# Patient Record
Sex: FEMALE | Race: WHITE | NOT HISPANIC OR LATINO | Employment: PART TIME | URBAN - METROPOLITAN AREA
[De-identification: names, ages, dates, MRNs, and addresses within clinical notes are randomized per-mention and may not be internally consistent; named-entity substitution may affect disease eponyms.]

---

## 2017-11-07 ENCOUNTER — OFFICE VISIT (OUTPATIENT)
Dept: FAMILY MEDICINE CLINIC | Facility: CLINIC | Age: 52
End: 2017-11-07

## 2017-11-07 ENCOUNTER — HOSPITAL ENCOUNTER (OUTPATIENT)
Dept: GENERAL RADIOLOGY | Facility: HOSPITAL | Age: 52
Discharge: HOME | End: 2017-11-07
Admitting: FAMILY MEDICINE

## 2017-11-07 VITALS
RESPIRATION RATE: 18 BRPM | BODY MASS INDEX: 24.8 KG/M2 | TEMPERATURE: 97.4 F | WEIGHT: 140 LBS | HEIGHT: 63 IN | DIASTOLIC BLOOD PRESSURE: 64 MMHG | HEART RATE: 76 BPM | SYSTOLIC BLOOD PRESSURE: 114 MMHG

## 2017-11-07 DIAGNOSIS — R06.02 SHORTNESS OF BREATH: Primary | ICD-10-CM

## 2017-11-07 DIAGNOSIS — M62.89 TENSOR FASCIA LATA SYNDROME: ICD-10-CM

## 2017-11-07 LAB
ALBUMIN SERPL-MCNC: 4.5 G/DL (ref 3.2–4.8)
ALBUMIN/GLOB SERPL: 2.1 G/DL (ref 1.5–2.5)
ALP SERPL-CCNC: 78 U/L (ref 25–100)
ALT SERPL-CCNC: 15 U/L (ref 7–40)
AST SERPL-CCNC: 23 U/L (ref 0–33)
BASOPHILS # BLD AUTO: 0.03 10*3/MM3 (ref 0–0.2)
BASOPHILS NFR BLD AUTO: 0.5 % (ref 0–1)
BILIRUB SERPL-MCNC: 0.7 MG/DL (ref 0.3–1.2)
BUN SERPL-MCNC: 10 MG/DL (ref 9–23)
BUN/CREAT SERPL: 11.1 (ref 7–25)
CALCIUM SERPL-MCNC: 9.6 MG/DL (ref 8.7–10.4)
CHLORIDE SERPL-SCNC: 108 MMOL/L (ref 99–109)
CO2 SERPL-SCNC: 29 MMOL/L (ref 20–31)
CREAT SERPL-MCNC: 0.9 MG/DL (ref 0.6–1.3)
EOSINOPHIL # BLD AUTO: 0.13 10*3/MM3 (ref 0–0.3)
EOSINOPHIL NFR BLD AUTO: 2.1 % (ref 0–3)
ERYTHROCYTE [DISTWIDTH] IN BLOOD BY AUTOMATED COUNT: 13.2 % (ref 11.3–14.5)
GFR SERPLBLD CREATININE-BSD FMLA CKD-EPI: 66 ML/MIN/1.73
GFR SERPLBLD CREATININE-BSD FMLA CKD-EPI: 80 ML/MIN/1.73
GLOBULIN SER CALC-MCNC: 2.1 GM/DL
GLUCOSE SERPL-MCNC: 89 MG/DL (ref 70–100)
HCT VFR BLD AUTO: 39.9 % (ref 34.5–44)
HGB BLD-MCNC: 12.9 G/DL (ref 11.5–15.5)
IMM GRANULOCYTES # BLD: 0.01 10*3/MM3 (ref 0–0.03)
IMM GRANULOCYTES NFR BLD: 0.2 % (ref 0–0.6)
LYMPHOCYTES # BLD AUTO: 2.55 10*3/MM3 (ref 0.6–4.8)
LYMPHOCYTES NFR BLD AUTO: 41 % (ref 24–44)
MCH RBC QN AUTO: 28.7 PG (ref 27–31)
MCHC RBC AUTO-ENTMCNC: 32.3 G/DL (ref 32–36)
MCV RBC AUTO: 88.7 FL (ref 80–99)
MONOCYTES # BLD AUTO: 0.42 10*3/MM3 (ref 0–1)
MONOCYTES NFR BLD AUTO: 6.8 % (ref 0–12)
NEUTROPHILS # BLD AUTO: 3.08 10*3/MM3 (ref 1.5–8.3)
NEUTROPHILS NFR BLD AUTO: 49.4 % (ref 41–71)
PLATELET # BLD AUTO: 180 10*3/MM3 (ref 150–450)
POTASSIUM SERPL-SCNC: 4.1 MMOL/L (ref 3.5–5.5)
PROT SERPL-MCNC: 6.6 G/DL (ref 5.7–8.2)
RBC # BLD AUTO: 4.5 10*6/MM3 (ref 3.89–5.14)
SODIUM SERPL-SCNC: 144 MMOL/L (ref 132–146)
WBC # BLD AUTO: 6.22 10*3/MM3 (ref 3.5–10.8)

## 2017-11-07 PROCEDURE — 99214 OFFICE O/P EST MOD 30 MIN: CPT | Performed by: FAMILY MEDICINE

## 2017-11-07 PROCEDURE — 71020 HC CHEST PA AND LATERAL: CPT

## 2017-11-07 NOTE — PROGRESS NOTES
Subjective   Sena Rogers is a 52 y.o. female.     History of Present Illness     She can do physical activity and feel fine  However from time to time she is laying in bed and feels like she cannot get a full breath in  She also yawns all the time  Will have a lot of yawning a lot and then will feel better after time  Can happen other times as well  This can happen several times or not at all during the day  She notes she had some issues at [facility] with a breathing test 20 years ago  Aggravating: stress, worry (she does worry more at night)  Alleviating: time    She also notes that her hip started to hurt her when she walked a long distance in her neighborhood a couple weeks ago  Pain on her outside of both thighs  Did not use anything and this slowly improved  She then went on a vacation to [facility] and her legs started to hurt again    The following portions of the patient's history were reviewed and updated as appropriate: allergies, current medications, past family history, past medical history, past social history, past surgical history and problem list.    Review of Systems   Constitutional: Negative.    HENT: Negative.    Eyes: Negative.    Respiratory: Positive for shortness of breath. Negative for cough.    Cardiovascular: Negative.  Negative for chest pain and palpitations.   Gastrointestinal: Negative.    Musculoskeletal:        Hpi   Skin: Negative.    Neurological: Negative.    Psychiatric/Behavioral: Negative.    All other systems reviewed and are negative.      Objective   Physical Exam   Constitutional: She is oriented to person, place, and time. She appears well-developed and well-nourished. No distress.   HENT:   Head: Normocephalic and atraumatic.   Right Ear: Tympanic membrane, external ear and ear canal normal.   Left Ear: Tympanic membrane, external ear and ear canal normal.   Nose: Nose normal.   Mouth/Throat: Uvula is midline and oropharynx is clear and moist.   Eyes:  Conjunctivae and EOM are normal.   Neck: Normal range of motion. Neck supple. No thyromegaly present.   Cardiovascular: Normal rate, regular rhythm and normal heart sounds.    No murmur heard.  Pulmonary/Chest: Effort normal and breath sounds normal. No respiratory distress.   Abdominal: Soft. Bowel sounds are normal. She exhibits no distension and no mass. There is no tenderness.   Musculoskeletal:   Normal inspection and palpation of her hips.  Normal internal/external rotation of her hips.  Normal gait.   Lymphadenopathy:     She has no cervical adenopathy.   Neurological: She is alert and oriented to person, place, and time.   Skin: Skin is warm and dry.   Psychiatric: She has a normal mood and affect. Her behavior is normal. Judgment and thought content normal.   Nursing note and vitals reviewed.      Assessment/Plan   Sena was seen today for hip pain and breathing problem.    Diagnoses and all orders for this visit:    Shortness of breath  -     XR Chest PA & Lateral  -     Full Pulmonary Function Test With Bronchodilator; Future  -     CBC & Differential  -     Comprehensive Metabolic Panel    Tensor fascia radha syndrome    unsure of cause of SOA, this happens at rest, no CP associated.  Could be stress related.  Will check CXR, labs and PFTs.  F/u pending results.  Pt agrees and she will RTO if this worsens  Discussed with pt the cause of her pain with excessive walking.  Consider formal PT but would increase her exercise tolerance and stretch to prevent in the future but she has no plans to walk this much any time soon

## 2017-11-17 ENCOUNTER — OFFICE VISIT (OUTPATIENT)
Dept: PULMONOLOGY | Facility: CLINIC | Age: 52
End: 2017-11-17

## 2017-11-17 DIAGNOSIS — R06.02 SHORTNESS OF BREATH: Primary | ICD-10-CM

## 2017-11-17 PROCEDURE — 94060 EVALUATION OF WHEEZING: CPT | Performed by: INTERNAL MEDICINE

## 2017-11-17 PROCEDURE — 94726 PLETHYSMOGRAPHY LUNG VOLUMES: CPT | Performed by: INTERNAL MEDICINE

## 2017-11-17 PROCEDURE — 94729 DIFFUSING CAPACITY: CPT | Performed by: INTERNAL MEDICINE

## 2017-11-22 ENCOUNTER — TELEPHONE (OUTPATIENT)
Dept: FAMILY MEDICINE CLINIC | Facility: CLINIC | Age: 52
End: 2017-11-22

## 2017-11-22 NOTE — TELEPHONE ENCOUNTER
----- Message from Evangelina Barragan sent at 11/22/2017  2:11 PM EST -----  Contact: DR PERSAUD RESULTS INQUIRY  PATIENT IS CALLING ABOUT HER PULMONARY FUNCTION RESULTS PLEASE CALL BACK AT [phone].

## 2017-11-28 ENCOUNTER — TELEPHONE (OUTPATIENT)
Dept: FAMILY MEDICINE CLINIC | Facility: CLINIC | Age: 52
End: 2017-11-28

## 2017-11-28 NOTE — TELEPHONE ENCOUNTER
----- Message from Corinna Jackson MA sent at 11/28/2017  3:53 PM EST -----  Regarding: FW: Test Results Question  Contact: [phone]      ----- Message -----     From: Sena Rogers     Sent: 11/28/2017   3:31 PM       To: [facility]  Subject: Test Results Question                            I would like to understand the PFT results, diagnosis, and suggested treatments. I have picked up the prescribed inhaler, but would like to know if that's a short- or long-term treatment. Thank you.

## 2017-11-28 NOTE — TELEPHONE ENCOUNTER
We are going to try this inhaler and follow up to discuss things further.  If she feels better it may be long term.  Please schedule f/u appointment in the next couple weeks to discuss how this inhaler is working.

## 2017-11-29 NOTE — TELEPHONE ENCOUNTER
Spoke with pt and went over response. Verbalized understanding. She will check her work schedule then call back to schedule f/u appt

## 2017-12-29 ENCOUNTER — TELEPHONE (OUTPATIENT)
Dept: FAMILY MEDICINE CLINIC | Facility: CLINIC | Age: 52
End: 2017-12-29

## 2017-12-29 ENCOUNTER — PATIENT MESSAGE (OUTPATIENT)
Dept: PULMONOLOGY | Facility: CLINIC | Age: 52
End: 2017-12-29

## 2017-12-29 RX ORDER — FLUTICASONE PROPIONATE 110 UG/1
1 AEROSOL, METERED RESPIRATORY (INHALATION)
Qty: 1 INHALER | Refills: 5 | Status: SHIPPED | OUTPATIENT
Start: 2017-12-29

## 2017-12-29 NOTE — TELEPHONE ENCOUNTER
----- Message from Evangelina Barragan sent at 12/29/2017  8:32 AM EST -----  Contact: DR PERSAUD   PATIENT HAS BEEN TRYING THE INHALER (FLOVENT) FOR THE PAST MONTH. IT HAS BEEN HELPING A LITTLE. SHOULD SHE STAY ON THIS? WHAT SHOULD SHE DO?  [phone]

## 2018-01-10 ENCOUNTER — TELEPHONE (OUTPATIENT)
Dept: FAMILY MEDICINE CLINIC | Facility: CLINIC | Age: 53
End: 2018-01-10

## 2018-01-10 PROBLEM — J44.9 OBSTRUCTIVE LUNG DISEASE (HCC): Status: ACTIVE | Noted: 2018-01-10

## 2018-01-10 NOTE — TELEPHONE ENCOUNTER
Her diagnosis is mild obstructive lung disease, based on her PFTs.  The inhaler is long term as it calms down the inflammation felt to cause obstruction.  Without it the SOA will return.

## 2018-01-10 NOTE — TELEPHONE ENCOUNTER
Regarding: Test Results Question  Contact: [phone]  ----- Message from Celina Weeks MA sent at 1/8/2018  3:06 PM EST -----       ----- Message from Sena Rogers to Gwyn Wilkins MD sent at 1/8/2018  2:57 PM -----   I received a call back to continue using the inhaler for my shortness of breath, but I still do not know the diagnosis from my recent visit and tests. Is the diagnosis Asthma or something else, and is the use of the inhaler long-term or will it resolve the problem?  Thank you.

## 2018-01-12 ENCOUNTER — TELEPHONE (OUTPATIENT)
Dept: FAMILY MEDICINE CLINIC | Facility: CLINIC | Age: 53
End: 2018-01-12

## 2018-01-12 NOTE — TELEPHONE ENCOUNTER
"----- Message from Corinna Jackson MA sent at 1/11/2018 12:47 PM EST -----  Regarding: FW: Test Results Question  Contact: [phone]      ----- Message -----     From: Sena Rogers     Sent: 1/11/2018  12:41 PM       To: [facility]  Subject: Test Results Question                            I made an appointment in November in hopes of finding the cause of my shortness of breath. I was sent to several locations for various tests, all of which I completed. Following these tests, a prescription was called in for an inhaler. I have made 3 attempts to contact Dr. Wilkins for a diagnosis and further information. I have received 2 callbacks from someone else in the office re: these messages. I don't know if these messages are actually given to the doctor, but I would like to pass along that I have been disappointed in the lack of information, and apparent interest, in my health concerns. I was only told to use the inhaler, and that a follow-up appointment was not necessary. I don't blame the caller, as I'm sure she was providing all the information she was able to provide. I still did not receive a diagnosis. Following my third attempt, the person who called me back did read off the diagnosis to me.  I received what I feel is a devastating diagnosis of an incurable condition,  and then could not get answers to any of my questions. After asking a few basic questions, I realized they could not be answered by the caller and I gave up.    I put my terry in your practice, and recommend it to my friends, and yet I am currently on Google trying to get more information on this diagnosis.  \"Mild Obstructive Pulmonary Disease\" (as it was referred to on the phone) is complex and seems to require very personalized treatment. I deal with severe COPD with my father and it is a terrifying diagnosis, and completely unexpected as I have never smoked or had any of the risk factors.      I am concerned about my " "future health as it is related to COPD, and want to make sure I'm doing all I can to prolong the function of my lungs. I currently don't even have a \"treatment plan\" other than to use the inhaler to minimize discomfort. I just thought this should be shared with you because I am not sure if you're aware of how this all happened, but maybe some practices could be changed so future patients aren't made to feel the same way. Maybe doctors in your practice do not make follow-up calls to their patients, but my opinion is that they should, except in cases where the results are good.  Thank you.  "

## 2018-01-12 NOTE — TELEPHONE ENCOUNTER
SPOKE WITH PT AND INFORMED HER OF MESSAGE AND PT VERBALIZED UNDERSTANDING AND WILL CALL NEXT WEEK ONCE SHE KNOWS HER SCHEDULE.

## 2018-01-12 NOTE — TELEPHONE ENCOUNTER
She is describing the difficulty with phone calls.  It is almost impossible to answer some questions over the phone or to make sure patients are clear on what is going on.  We always encourage patients to come in for appointment so we can make sure everyone is on the same page.  Please have pt schedule an appointment so we can make sure we are all on the same page with her diagnosis, plan of care, and future issues.    Her concerns cannot be adequately reviewed via phone.  Please schedule appointment

## 2018-03-19 ENCOUNTER — TELEPHONE (OUTPATIENT)
Dept: FAMILY MEDICINE CLINIC | Facility: CLINIC | Age: 53
End: 2018-03-19

## 2018-03-28 ENCOUNTER — OFFICE VISIT (OUTPATIENT)
Dept: FAMILY MEDICINE CLINIC | Facility: CLINIC | Age: 53
End: 2018-03-28

## 2018-03-28 VITALS
BODY MASS INDEX: 24.8 KG/M2 | HEIGHT: 63 IN | RESPIRATION RATE: 18 BRPM | TEMPERATURE: 98.4 F | DIASTOLIC BLOOD PRESSURE: 66 MMHG | WEIGHT: 140 LBS | HEART RATE: 74 BPM | SYSTOLIC BLOOD PRESSURE: 110 MMHG

## 2018-03-28 DIAGNOSIS — J41.0 SIMPLE CHRONIC BRONCHITIS (HCC): Primary | ICD-10-CM

## 2018-03-28 PROCEDURE — 99213 OFFICE O/P EST LOW 20 MIN: CPT | Performed by: FAMILY MEDICINE

## 2018-03-28 NOTE — PROGRESS NOTES
Subjective   Sena Rogers is a 52 y.o. female.     History of Present Illness     There was some mild obstructive disease  Dad passed away from COPD yolanda was a long term smoker    She had severe coughing in the AM as well as cleared heart throat a lot in the AM  She has been using the flovent sporadically    Her sister was also diagnosed with COPD 6 years ago and she is worse than pt with her breathing    The following portions of the patient's history were reviewed and updated as appropriate: allergies, current medications, past family history, past medical history, past social history, past surgical history and problem list.    Review of Systems   Constitutional: Negative.        Objective   Physical Exam   Constitutional: She appears well-developed and well-nourished. No distress.   Cardiovascular: Normal rate, regular rhythm and normal heart sounds.    Pulmonary/Chest: Effort normal and breath sounds normal.   Psychiatric: She has a normal mood and affect. Her behavior is normal.   Nursing note and vitals reviewed.      Assessment/Plan   Sena was seen today for follow-up.    Diagnoses and all orders for this visit:    Simple chronic bronchitis    obstruction noted on PFTs, so diagnosis of mild COPD discussed at length with pt.  She has mild symptoms and will use flovent once daily to see if this helps, may go to BID.  Consider spiriva in the future as well.  She will follow up as needed for symptoms and see what pulmonologist recommends to her sister.           
detailed exam

## 2018-07-13 ENCOUNTER — TELEPHONE (OUTPATIENT)
Dept: FAMILY MEDICINE CLINIC | Facility: CLINIC | Age: 53
End: 2018-07-13

## 2018-07-13 DIAGNOSIS — J41.0 SIMPLE CHRONIC BRONCHITIS (HCC): Primary | ICD-10-CM

## 2018-09-10 ENCOUNTER — TELEPHONE (OUTPATIENT)
Dept: FAMILY MEDICINE CLINIC | Facility: CLINIC | Age: 53
End: 2018-09-10

## 2018-09-17 ENCOUNTER — RESULTS ENCOUNTER (OUTPATIENT)
Dept: FAMILY MEDICINE CLINIC | Facility: CLINIC | Age: 53
End: 2018-09-17

## 2018-09-17 ENCOUNTER — OFFICE VISIT (OUTPATIENT)
Dept: FAMILY MEDICINE CLINIC | Facility: CLINIC | Age: 53
End: 2018-09-17

## 2018-09-17 VITALS
HEIGHT: 63 IN | RESPIRATION RATE: 18 BRPM | WEIGHT: 143 LBS | BODY MASS INDEX: 25.34 KG/M2 | DIASTOLIC BLOOD PRESSURE: 82 MMHG | HEART RATE: 72 BPM | TEMPERATURE: 98.3 F | SYSTOLIC BLOOD PRESSURE: 122 MMHG

## 2018-09-17 DIAGNOSIS — J41.0 SIMPLE CHRONIC BRONCHITIS (HCC): Primary | ICD-10-CM

## 2018-09-17 DIAGNOSIS — Z83.49 FAMILY HISTORY OF ALPHA 1 ANTITRYPSIN DEFICIENCY: ICD-10-CM

## 2018-09-17 DIAGNOSIS — J41.0 SIMPLE CHRONIC BRONCHITIS (HCC): ICD-10-CM

## 2018-09-17 LAB
ALBUMIN SERPL-MCNC: 4.35 G/DL (ref 3.2–4.8)
ALBUMIN/GLOB SERPL: 2.4 G/DL (ref 1.5–2.5)
ALP SERPL-CCNC: 94 U/L (ref 25–100)
ALT SERPL-CCNC: 33 U/L (ref 7–40)
AST SERPL-CCNC: 30 U/L (ref 0–33)
BASOPHILS # BLD AUTO: 0.02 10*3/MM3 (ref 0–0.2)
BASOPHILS NFR BLD AUTO: 0.4 % (ref 0–1)
BILIRUB SERPL-MCNC: 0.5 MG/DL (ref 0.3–1.2)
BUN SERPL-MCNC: 12 MG/DL (ref 9–23)
BUN/CREAT SERPL: 16 (ref 7–25)
CALCIUM SERPL-MCNC: 9.3 MG/DL (ref 8.7–10.4)
CHLORIDE SERPL-SCNC: 109 MMOL/L (ref 99–109)
CO2 SERPL-SCNC: 27 MMOL/L (ref 20–31)
CREAT SERPL-MCNC: 0.75 MG/DL (ref 0.6–1.3)
EOSINOPHIL # BLD AUTO: 0.14 10*3/MM3 (ref 0–0.3)
EOSINOPHIL NFR BLD AUTO: 2.8 % (ref 0–3)
ERYTHROCYTE [DISTWIDTH] IN BLOOD BY AUTOMATED COUNT: 13.9 % (ref 11.3–14.5)
GLOBULIN SER CALC-MCNC: 1.9 GM/DL
GLUCOSE SERPL-MCNC: 60 MG/DL (ref 70–100)
HCT VFR BLD AUTO: 39.1 % (ref 34.5–44)
HGB BLD-MCNC: 12.8 G/DL (ref 11.5–15.5)
IMM GRANULOCYTES # BLD: 0.01 10*3/MM3 (ref 0–0.03)
IMM GRANULOCYTES NFR BLD: 0.2 % (ref 0–0.6)
LYMPHOCYTES # BLD AUTO: 2.33 10*3/MM3 (ref 0.6–4.8)
LYMPHOCYTES NFR BLD AUTO: 46 % (ref 24–44)
MCH RBC QN AUTO: 28.7 PG (ref 27–31)
MCHC RBC AUTO-ENTMCNC: 32.7 G/DL (ref 32–36)
MCV RBC AUTO: 87.7 FL (ref 80–99)
MONOCYTES # BLD AUTO: 0.32 10*3/MM3 (ref 0–1)
MONOCYTES NFR BLD AUTO: 6.3 % (ref 0–12)
NEUTROPHILS # BLD AUTO: 2.26 10*3/MM3 (ref 1.5–8.3)
NEUTROPHILS NFR BLD AUTO: 44.5 % (ref 41–71)
PLATELET # BLD AUTO: 172 10*3/MM3 (ref 150–450)
POTASSIUM SERPL-SCNC: 4.2 MMOL/L (ref 3.5–5.5)
PROT SERPL-MCNC: 6.2 G/DL (ref 5.7–8.2)
RBC # BLD AUTO: 4.46 10*6/MM3 (ref 3.89–5.14)
SODIUM SERPL-SCNC: 146 MMOL/L (ref 132–146)
WBC # BLD AUTO: 5.07 10*3/MM3 (ref 3.5–10.8)

## 2018-09-17 PROCEDURE — 99213 OFFICE O/P EST LOW 20 MIN: CPT | Performed by: FAMILY MEDICINE

## 2018-09-17 NOTE — PROGRESS NOTES
Subjective   Sena Rogers is a 53 y.o. female.     History of Present Illness     Her sister had some issues with brwathing  Her sister tested positive for alpha 1 antitrypsin disease and pt would like to be screened    Her breathing has been doing well    The following portions of the patient's history were reviewed and updated as appropriate: allergies, current medications, past family history, past medical history, past social history, past surgical history and problem list.    Review of Systems   Respiratory: Negative for shortness of breath.        Objective   Physical Exam   Constitutional: She appears well-developed and well-nourished. No distress.   Cardiovascular: Normal rate, regular rhythm and normal heart sounds.    Pulmonary/Chest: Effort normal and breath sounds normal.   Psychiatric: She has a normal mood and affect. Her behavior is normal.   Nursing note and vitals reviewed.      Assessment/Plan   Sena was seen today for breathing problem.    Diagnoses and all orders for this visit:    Simple chronic bronchitis (CMS/HCC)  -     Alpha - 1 - Antitrypsin; Future  -     CBC & Differential  -     Comprehensive Metabolic Panel    Family history of alpha 1 antitrypsin deficiency  -     Alpha - 1 - Antitrypsin; Future  -     CBC & Differential  -     Comprehensive Metabolic Panel    with her family history as well as personal history of early onset COPD in a non-smoker will check labs for alpha-1-antitrypsin and f/u pending labs

## 2018-09-26 LAB
A1AT SERPL-MCNC: NORMAL MG/DL
Lab: NORMAL
SPECIMEN STATUS: NORMAL
WRITTEN AUTHORIZATION: NORMAL

## 2022-10-24 NOTE — TELEPHONE ENCOUNTER
----- Message from Manny Delarosa DO sent at 10/23/2022  2:43 PM CDT -----  PLEASE LET PATIENT KNOW FOLLOWING:    Dr Delarosa reviewed recent labs/ imaging studies:    __XX____1. The following  results were reviewed and please inform patient of following:  Please let patient know kidney function, liver function look good however I would like him to try to increase fluid intake , slightly dehydrated stone.  Cholesterol numbers look good but good cholesterol remains low at 35. Blood counts look ok            _____2. Please schedule a visit with Dr Delarosa  In _______weeeks to  review labs/      Spoke with pt she is aware